# Patient Record
Sex: FEMALE | Race: BLACK OR AFRICAN AMERICAN | NOT HISPANIC OR LATINO | URBAN - METROPOLITAN AREA
[De-identification: names, ages, dates, MRNs, and addresses within clinical notes are randomized per-mention and may not be internally consistent; named-entity substitution may affect disease eponyms.]

---

## 2024-04-05 ENCOUNTER — EMERGENCY (EMERGENCY)
Facility: HOSPITAL | Age: 54
LOS: 1 days | Discharge: HOME | End: 2024-04-05
Admitting: EMERGENCY MEDICINE
Payer: COMMERCIAL

## 2024-04-05 VITALS
RESPIRATION RATE: 16 BRPM | OXYGEN SATURATION: 96 % | SYSTOLIC BLOOD PRESSURE: 121 MMHG | DIASTOLIC BLOOD PRESSURE: 75 MMHG | TEMPERATURE: 98 F | WEIGHT: 210.1 LBS | HEART RATE: 98 BPM | HEIGHT: 68 IN

## 2024-04-05 PROCEDURE — 99283 EMERGENCY DEPT VISIT LOW MDM: CPT

## 2024-04-05 RX ORDER — CYCLOBENZAPRINE HYDROCHLORIDE 10 MG/1
10 TABLET, FILM COATED ORAL ONCE
Refills: 0 | Status: COMPLETED | OUTPATIENT
Start: 2024-04-05 | End: 2024-04-05

## 2024-04-05 RX ORDER — IBUPROFEN 200 MG
600 TABLET ORAL ONCE
Refills: 0 | Status: COMPLETED | OUTPATIENT
Start: 2024-04-05 | End: 2024-04-05

## 2024-04-05 RX ADMIN — Medication 600 MILLIGRAM(S): at 17:00

## 2024-04-05 RX ADMIN — CYCLOBENZAPRINE HYDROCHLORIDE 10 MILLIGRAM(S): 10 TABLET, FILM COATED ORAL at 17:00

## 2024-04-05 NOTE — ED PROVIDER NOTE - CLINICAL SUMMARY MEDICAL DECISION MAKING FREE TEXT BOX
54 yo F presents for chronic pain  No swelling, erythema, warmth to joint. ROM intact, VSS - septic arthritis unlikely.  Pt does not meet Wells criteria - DVT unlikely  Does not meet Karavel’s signs for Flexor Tenosynovitis  No paresthesia, pallor, paralysis, pulselenness - compartment syndrome unlikely  Pain control medications ordered

## 2024-04-05 NOTE — ED PROVIDER NOTE - OBJECTIVE STATEMENT
53-year-old female, no medical history, presents this emergency room for 1 month of upper leg pain.  Denies any trauma.  States that she went to another emergency room a month ago, was told she had a UTI, and finished a course of antibiotics.  Has not followed up with her primary care doctor since then.  States that she moved to [place], and would like a new primary care.  Patient states that it is painful to walk, however is ambulatory.  Denies SOB, CP, calf tenderness/swelling, hemoptysis, recent surgeries, hx of CA, long plane/train/car rides, past clots in legs/lungs. Has been ambulating.

## 2024-04-05 NOTE — ED PROVIDER NOTE - NSFOLLOWUPINSTRUCTIONS_ED_ALL_ED_FT
Overview  Many things can cause leg pain. Too much exercise or overuse can cause a muscle cramp (or charley horse). You can get leg cramps from not eating a balanced diet that has enough potassium, calcium, and other minerals. If you do not drink enough fluids or are taking certain medicines, you may develop leg cramps. Other causes of leg pain include injuries, blood flow problems, nerve damage, and twisted and enlarged veins (varicose veins).    You can usually ease pain with self-care. Your doctor may recommend that you rest your leg and keep it elevated.    Follow-up care is a key part of your treatment and safety. Be sure to make and go to all appointments, and call your doctor or nurse advice line (231 in most provinces and [place]) if you are having problems. It's also a good idea to know your test results and keep a list of the medicines you take.    How can you care for yourself at home?  Take pain medicines exactly as directed.  If the doctor gave you a prescription medicine for pain, take it as prescribed.  If you are not taking a prescription pain medicine, ask your doctor if you can take an over-the-counter medicine.  Take any other medicines exactly as prescribed. Call your doctor or nurse advice line if you think you are having a problem with your medicine.  Rest your leg while you have pain, and avoid standing for long periods of time.  Prop up your leg at or above the level of your heart when possible.  Make sure you are eating a balanced diet that is rich in calcium, potassium, and magnesium, especially if you are pregnant.  If directed by your doctor, put ice or a cold pack on the area for 10 to 20 minutes at a time. Put a thin cloth between the ice and your skin.  Your leg may be in a splint, a brace, or an elastic bandage, and you may have crutches to help you walk. Follow your doctor's directions about how long to wear supports and how to use the crutches.  When should you call for help?  	  Call 911 anytime you think you may need emergency care. For example, call if:    You have sudden chest pain and shortness of breath, or you cough up blood.  Your leg is cool or pale or changes colour.  Call your doctor or nurse advice line now or seek immediate medical care if:    You have increasing or severe pain.  Your leg suddenly feels weak and you cannot move it.  You have signs of a blood clot, such as:  Pain in your calf, back of the knee, thigh, or groin.  Redness and swelling in your leg or groin.  You have signs of infection, such as:  Increased pain, swelling, warmth, or redness.  Red streaks leading from the sore area.  Pus draining from a place on your leg.  A fever.  You cannot bear weight on your leg.  Watch closely for changes in your health, and be sure to contact your doctor or nurse advice line if:    You do not get better as expected.

## 2024-04-05 NOTE — ED PROVIDER NOTE - PATIENT PORTAL LINK FT
You can access the FollowMyHealth Patient Portal offered by [facility] by registering at the following website: [email or web address]. By joining [facility]’s FollowMyHealth portal, you will also be able to view your health information using other applications (apps) compatible with our system.

## 2024-04-05 NOTE — ED ADULT TRIAGE NOTE - LOCATION:
Treatment Plan Tracking    Initial Treatment Plan Treatment Plan not completed within required time limits due to: the Client canceled her prior appointment   Left arm;

## 2024-04-05 NOTE — ED ADULT NURSE NOTE - OBJECTIVE STATEMENT
Pt presents to ED A&Ox4 with c/o chronic b/l hip and upper leg pain for greater than a month. Pt denies trauma to area and reports pain has worsened since onset. Pt also reports edema to area. Pain is exacerbated with walking but pt able to ambulate with steady gait. Also reports associated back pain. Denies loss of bowel or bladder function. Denies SOB, headache, chest pain. Denies PMH or prescribed medications.

## 2024-04-08 DIAGNOSIS — G89.29 OTHER CHRONIC PAIN: ICD-10-CM

## 2024-04-08 DIAGNOSIS — M79.606 PAIN IN LEG, UNSPECIFIED: ICD-10-CM

## 2024-04-08 DIAGNOSIS — Z88.0 ALLERGY STATUS TO PENICILLIN: ICD-10-CM
